# Patient Record
Sex: FEMALE | Race: BLACK OR AFRICAN AMERICAN
[De-identification: names, ages, dates, MRNs, and addresses within clinical notes are randomized per-mention and may not be internally consistent; named-entity substitution may affect disease eponyms.]

---

## 2017-03-31 VITALS
SYSTOLIC BLOOD PRESSURE: 146 MMHG | DIASTOLIC BLOOD PRESSURE: 50 MMHG | SYSTOLIC BLOOD PRESSURE: 146 MMHG | DIASTOLIC BLOOD PRESSURE: 50 MMHG

## 2017-03-31 NOTE — PHYS DOC
Past Medical History


Past Medical History:  Asthma


Past Surgical History:  , Other


Additional Past Surgical Histo:  hernia repair


Alcohol Use:  None


Drug Use:  None





Adult General


Chief Complaint


Chief Complaint:  UPPER EXTREMITY INJURY





HPI


HPI


Patient is a 41  year old female who presents with left wrist pain. Patient 

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 

in bed, states her wrist popped and she had pain at that time. She has had 

chronic pain since the time of injury. She had imaging performed at the time of 

injury as well as 2 months later in 2016. She has been referred for 

physical therapy but has not been able to make it to an appointment yet due to 

past schedules. She denies any new injuries. Has been wearing a wrist brace, 

takes ibuprofen occasionally.





Review of Systems


Review of Systems


Constitutional: Denies fever or chills 


HENT: Denies nasal congestion or sore throat 


Respiratory: Denies cough 


Cardiovascular:  Denies chest pain


GI: Denies abdominal pain, nausea, vomiting


Musculoskeletal: Reports wrist pain 


Integument: Denies rash


Neurologic: Denies headache





Current Medications


Current Medications








 Current Medications








 Medications


  (Trade)  Dose


 Ordered  Sig/Margareth  Start Time


 Stop Time Status Last Admin


Dose Admin


 


 Tramadol HCl


  (Ultram)  50 mg  1X  ONCE  3/31/17 11:45


 3/31/17 11:46 DC  


 














Allergies


Allergies





 Allergies








Coded Allergies Type Severity Reaction Last Updated Verified


 


  No Known Drug Allergies    3/31/17 No











Physical Exam


Physical Exam


Constitutional: Obese, no acute distress, non-toxic appearance. 


HENT: Normocephalic, atraumatic, bilateral external ears normal, oropharynx 

moist, nose normal.


Eyes: conjunctiva normal, no discharge.


Cardiovascular:  no edema. 


Lungs & Thorax: no respiratory distress.


Abdomen:  nondistended.


Skin: Warm, dry,


Extremities: Left wrist no swelling or deformity, generalized tenderness about 

the wrist and distal forearm on the volar aspect, intact range of motion with 

flexion and extension at the wrist, radial pulse 2+, radial/median/ulnar nerve 

sensory and motor function intact. No elbow tenderness, compartments soft.


Neurologic: Alert and oriented X 3





Current Patient Data


Vital Signs





 Vital Signs








  Date Time  Temp Pulse Resp B/P Pulse Ox O2 Delivery O2 Flow Rate FiO2


 


3/31/17 11:04 98.1 68 18  100 Room Air  





 98.1       











EKG


EKG


[]





Radiology/Procedures


Radiology/Procedures


[]





Course & Med Decision Making


Course & Med Decision Making


Pertinent Labs and Imaging studies reviewed. (See chart for details)


Patient presents with chronic wrist pain after injury. She had paperwork with 

her detailing negative radiographic exam in December. Discussed lack of utility 

of additional x-ray. At this time she is comfortable with that plan. 

Recommended ice packs, use brace as needed for comfort, take Tylenol or 

ibuprofen at home, gave prescription for tramadol to take as needed for severe 

pain. No drinking alcohol or driving while taking this medication, may cause 

sedation. Follow-up with physical therapy as his heart even arranged. For any 

additional concerns please see Dr. Moore in the primary care clinic. Patient 

discharged home in stable condition.


[]





Dragon Disclaimer


Dragon Disclaimer


This electronic medical record was generated, in whole or in part, using a 

voice recognition dictation system.





Departure


Departure


Impression:  


 Primary Impression:  


 Wrist pain, left


Disposition:  01 HOME, SELF-CARE


Condition:  STABLE


Referrals:  


TRINA MOORE MD (PCP)


Patient Instructions:  Wrist Pain, Easy-to-Read





Additional Instructions:


You were seen in the emergency department today for wrist pain. It is very 

important to start going to physical therapy so that you can regain normal use 

of your wrist. In the meantime continue to use the brace, apply heating pads, 

use Tylenol or ibuprofen and if that isn't help you can take tramadol for 

severe pain. No drinking alcohol or driving while taking this medication. Follow

-up with Dr. Moore in a primary care clinic if not improving after physical 

therapy.


Scripts


Tramadol Hcl 50 Mg Tablet1 Tab PO PRN Q6HRS PRN PAIN #10 TAB


   Prov:OSCAR DUNCAN MD         3/31/17








OSCAR DUNCAN MD Mar 31, 2017 11:41

## 2021-05-07 ENCOUNTER — HOSPITAL ENCOUNTER (OUTPATIENT)
Dept: HOSPITAL 61 - KCIC MAMMO | Age: 46
End: 2021-05-07
Attending: INTERNAL MEDICINE
Payer: COMMERCIAL

## 2021-05-07 DIAGNOSIS — N64.89: ICD-10-CM

## 2021-05-07 DIAGNOSIS — Z12.31: Primary | ICD-10-CM

## 2021-05-07 PROCEDURE — 77067 SCR MAMMO BI INCL CAD: CPT

## 2021-05-07 NOTE — [facility]
Bilateral digital screening mammograms:



Reason for examination: Routine screening.



Comparison is made to previous study dated 5/1/2014.



Interpretation was made with the benefit of CAD.



The skin and nipples show no abnormalities. No abnormal axillary lymph nodes are seen. The breast par
enchyma is extremely dense. (Breast density: Category D.) There appears to be a new nodule present in
 the left breast at the 8:30 B position approximately 5.5 cm from the nipple and measuring 7.5 mm in 
size. Recommend further evaluation with ultrasound. There are no other dominant masses, suspicious ca
lcifications or architectural distortion.



Impression:



7.5 mm nodular density medially in the left breast at approximately the 8:30 position 5.5 cm from the
 nipple. Recommend further evaluation with ultrasound.



Your patient's mammogram demonstrates that she has dense breast tissue (breast density category C or 
D), which could hide abnormalities, and if she has other risk factors for breast cancer that have bee
n identified, she might benefit from supplemental screening tests that may be suggested by you as her
 ordering physician. Dense breast tissue, in and of itself, is a relatively common condition. Therefo
re, this information is not provided to cause undue concern, but rather to raise your awareness and t
o promote discussion with your patient regarding the presence of other risk factors, in addition to d
ense breast tissue. Your patient's mammography results will be sent to her. 



BI-RAD Category 0: Incomplete. Needs additional imaging evaluation.



"Our facility is accredited by the [employer] Mammography Program."



This patient's information has been entered into a reminder system for the patient to be notified wit
h the results of her examination and a target date for the next mammogram.



Electronically signed by: Marianne Horvath MD (5/7/2021 1:24 PM) [email or web address]

## 2021-05-25 ENCOUNTER — HOSPITAL ENCOUNTER (OUTPATIENT)
Dept: HOSPITAL 61 - KCIC US | Age: 46
End: 2021-05-25
Attending: INTERNAL MEDICINE
Payer: COMMERCIAL

## 2021-05-25 DIAGNOSIS — R92.8: Primary | ICD-10-CM

## 2021-05-25 PROCEDURE — 76641 ULTRASOUND BREAST COMPLETE: CPT

## 2021-05-25 NOTE — [facility]
Left breast ultrasound:



Reason for examination: Nodular density on screening mammogram.



Comparison is made to mammographic exam dated 5/7/2021.



Ultrasound examination of the left breast and axilla was performed.



At the 8:30 position 5.5 cm from the nipple, there is a 7.4 mm hypoechoic circumscribed lesion in par
allel orientation with a benign fibrocystic appearance. No other cystic or solid lesions are seen and
 no abnormal appearing lymph nodes are seen in the axilla.



IMPRESSION:



Benign-appearing fibrocystic lesion at the 8:30 position. No suspicious abnormality seen. Recommend 6
 month follow-up with ultrasound.



BI-RADS Category 3:  Probably Benign.

 

"Our facility is accredited by the [employer] Mammography Program."



This patient's information has been entered into a reminder system for the patient to be notified wit
h the results of her examination and a target date for the next mammogram.



Electronically signed by: Marianne Horvath MD (5/25/2021 1:10 PM) [email or web address]